# Patient Record
Sex: FEMALE | Race: WHITE | NOT HISPANIC OR LATINO
[De-identification: names, ages, dates, MRNs, and addresses within clinical notes are randomized per-mention and may not be internally consistent; named-entity substitution may affect disease eponyms.]

---

## 2024-01-08 ENCOUNTER — DASHBOARD ENCOUNTERS (OUTPATIENT)
Age: 69
End: 2024-01-08

## 2024-01-08 ENCOUNTER — OFFICE VISIT (OUTPATIENT)
Dept: URBAN - METROPOLITAN AREA CLINIC 113 | Facility: CLINIC | Age: 69
End: 2024-01-08
Payer: MEDICARE

## 2024-01-08 VITALS
DIASTOLIC BLOOD PRESSURE: 60 MMHG | RESPIRATION RATE: 20 BRPM | WEIGHT: 97.4 LBS | TEMPERATURE: 97.5 F | HEIGHT: 63 IN | SYSTOLIC BLOOD PRESSURE: 107 MMHG | BODY MASS INDEX: 17.26 KG/M2 | HEART RATE: 65 BPM

## 2024-01-08 DIAGNOSIS — K57.30 DIVERTICULOSIS OF COLON WITHOUT DIVERTICULITIS: ICD-10-CM

## 2024-01-08 DIAGNOSIS — Z80.0 FAMILY HISTORY OF COLON CANCER IN MOTHER: ICD-10-CM

## 2024-01-08 DIAGNOSIS — Z12.11 COLON CANCER SCREENING: ICD-10-CM

## 2024-01-08 PROBLEM — 398311004: Status: ACTIVE | Noted: 2024-01-08

## 2024-01-08 PROCEDURE — 99202 OFFICE O/P NEW SF 15 MIN: CPT | Performed by: INTERNAL MEDICINE

## 2024-01-08 RX ORDER — PAROXETINE HYDROCHLORIDE 10 MG/1
TABLET ORAL
Qty: 0 | Refills: 0 | Status: ACTIVE | COMMUNITY
Start: 1900-01-01 | End: 1900-01-01

## 2024-01-08 RX ORDER — VALACYCLOVIR HYDROCHLORIDE 500 MG/1
TABLET, FILM COATED ORAL
Qty: 0 | Refills: 0 | Status: ACTIVE | COMMUNITY
Start: 1900-01-01 | End: 1900-01-01

## 2024-01-08 RX ORDER — OMEGA-3-ACID ETHYL ESTERS 1 G/1
CAPSULE, LIQUID FILLED ORAL
Qty: 0 | Refills: 0 | Status: ACTIVE | COMMUNITY
Start: 1900-01-01 | End: 1900-01-01

## 2024-01-08 RX ORDER — CHLORHEXIDINE GLUCONATE 4 %
LIQUID (ML) TOPICAL
Qty: 0 | Refills: 0 | Status: ACTIVE | COMMUNITY
Start: 1900-01-01 | End: 1900-01-01

## 2024-01-08 NOTE — HPI-TODAY'S VISIT:
The patient is a very pleasant 68-year-old female referred for gastrointestinal issues.  Extensive chart review was performed.  Patient had been followed by gastroenterologist in [place].  Last colonoscopy was performed in May 2015 for screening purposes.  This revealed a diminutive splenic flexure polyp along with hemorrhoids and diverticulosis.  Pathology of that polyp returned as negative for adenomatous tissue. Referring records were reviewed.  Laboratory testing from September 25 of this year revealed a white cell count of 30,000 hemoglobin 14.1 with a low platelet count of 102.  Very high percent lymphocytes at 78%.  CMP was unremarkable. The patient comes in now to establish care.  She states she has no gastrointestinal complaints at this time.  She is due for colonoscopy next year.  She does have a family history of colon cancer and that her mother was diagnosed with colon cancer at age 85.  The patient denies any gastrointestinal complaints at this time.